# Patient Record
Sex: MALE | Race: WHITE | NOT HISPANIC OR LATINO
[De-identification: names, ages, dates, MRNs, and addresses within clinical notes are randomized per-mention and may not be internally consistent; named-entity substitution may affect disease eponyms.]

---

## 2018-10-19 ENCOUNTER — MEDICATION RENEWAL (OUTPATIENT)
Age: 58
End: 2018-10-19

## 2018-10-19 ENCOUNTER — RX RENEWAL (OUTPATIENT)
Age: 58
End: 2018-10-19

## 2018-10-19 PROBLEM — Z00.00 ENCOUNTER FOR PREVENTIVE HEALTH EXAMINATION: Status: ACTIVE | Noted: 2018-10-19

## 2018-12-07 ENCOUNTER — RECORD ABSTRACTING (OUTPATIENT)
Age: 58
End: 2018-12-07

## 2018-12-07 DIAGNOSIS — Z86.59 PERSONAL HISTORY OF OTHER MENTAL AND BEHAVIORAL DISORDERS: ICD-10-CM

## 2018-12-07 DIAGNOSIS — Z78.9 OTHER SPECIFIED HEALTH STATUS: ICD-10-CM

## 2018-12-07 DIAGNOSIS — F17.200 NICOTINE DEPENDENCE, UNSPECIFIED, UNCOMPLICATED: ICD-10-CM

## 2018-12-07 DIAGNOSIS — Z80.9 FAMILY HISTORY OF MALIGNANT NEOPLASM, UNSPECIFIED: ICD-10-CM

## 2018-12-07 DIAGNOSIS — Z86.39 PERSONAL HISTORY OF OTHER ENDOCRINE, NUTRITIONAL AND METABOLIC DISEASE: ICD-10-CM

## 2018-12-10 ENCOUNTER — APPOINTMENT (OUTPATIENT)
Dept: ENDOCRINOLOGY | Facility: CLINIC | Age: 58
End: 2018-12-10
Payer: COMMERCIAL

## 2018-12-10 ENCOUNTER — RX CHANGE (OUTPATIENT)
Age: 58
End: 2018-12-10

## 2018-12-10 VITALS
SYSTOLIC BLOOD PRESSURE: 150 MMHG | DIASTOLIC BLOOD PRESSURE: 80 MMHG | HEIGHT: 61 IN | OXYGEN SATURATION: 98 % | HEART RATE: 61 BPM | WEIGHT: 194 LBS | BODY MASS INDEX: 36.63 KG/M2

## 2018-12-10 LAB — GLUCOSE BLDC GLUCOMTR-MCNC: 119

## 2018-12-10 PROCEDURE — 82962 GLUCOSE BLOOD TEST: CPT

## 2018-12-10 PROCEDURE — 95249 CONT GLUC MNTR PT PROV EQP: CPT

## 2018-12-10 PROCEDURE — 99214 OFFICE O/P EST MOD 30 MIN: CPT | Mod: 25

## 2018-12-10 RX ORDER — ALPRAZOLAM 1 MG/1
1 TABLET ORAL
Qty: 90 | Refills: 0 | Status: ACTIVE | COMMUNITY
Start: 2018-08-27

## 2018-12-10 RX ORDER — LAMOTRIGINE 100 MG/1
100 TABLET ORAL
Qty: 30 | Refills: 0 | Status: ACTIVE | COMMUNITY
Start: 2018-05-29

## 2018-12-10 RX ORDER — BLOOD SUGAR DIAGNOSTIC
STRIP MISCELLANEOUS
Qty: 100 | Refills: 0 | Status: ACTIVE | COMMUNITY
Start: 2018-04-09

## 2018-12-10 RX ORDER — FLUOXETINE HYDROCHLORIDE 20 MG/1
20 CAPSULE ORAL
Refills: 0 | Status: DISCONTINUED | COMMUNITY
End: 2018-12-10

## 2019-01-04 ENCOUNTER — APPOINTMENT (OUTPATIENT)
Dept: ENDOCRINOLOGY | Facility: CLINIC | Age: 59
End: 2019-01-04
Payer: COMMERCIAL

## 2019-01-04 PROCEDURE — G0108 DIAB MANAGE TRN  PER INDIV: CPT

## 2019-01-07 ENCOUNTER — RESULT CHARGE (OUTPATIENT)
Age: 59
End: 2019-01-07

## 2019-02-19 ENCOUNTER — RECORD ABSTRACTING (OUTPATIENT)
Age: 59
End: 2019-02-19

## 2019-02-19 DIAGNOSIS — Z78.9 OTHER SPECIFIED HEALTH STATUS: ICD-10-CM

## 2019-02-19 DIAGNOSIS — Z87.19 PERSONAL HISTORY OF OTHER DISEASES OF THE DIGESTIVE SYSTEM: ICD-10-CM

## 2019-03-04 ENCOUNTER — APPOINTMENT (OUTPATIENT)
Dept: ENDOCRINOLOGY | Facility: CLINIC | Age: 59
End: 2019-03-04

## 2019-03-08 ENCOUNTER — MEDICATION RENEWAL (OUTPATIENT)
Age: 59
End: 2019-03-08

## 2019-03-25 ENCOUNTER — APPOINTMENT (OUTPATIENT)
Dept: ENDOCRINOLOGY | Facility: CLINIC | Age: 59
End: 2019-03-25
Payer: COMMERCIAL

## 2019-03-25 VITALS
DIASTOLIC BLOOD PRESSURE: 78 MMHG | HEIGHT: 73 IN | HEART RATE: 96 BPM | OXYGEN SATURATION: 74 % | BODY MASS INDEX: 26.51 KG/M2 | WEIGHT: 200 LBS | SYSTOLIC BLOOD PRESSURE: 120 MMHG

## 2019-03-25 PROCEDURE — 95251 CONT GLUC MNTR ANALYSIS I&R: CPT

## 2019-03-25 PROCEDURE — 99214 OFFICE O/P EST MOD 30 MIN: CPT | Mod: 25

## 2019-03-25 NOTE — HISTORY OF PRESENT ILLNESS
[FreeTextEntry1] : Pt. reports that he belongs to a partial patient program at [facility] for a hx of depression and PTSD. The program provides group therapy Monday-Friday. He also reports that he is under extreme stress with his wife's current condition who has vascular dementia and resides at [facility] in [place]. He relieves his stress by spending time with his 14 cats and watching TV.   \par \par Quality: Type 2 DM\par Severity: uncontrolled \par Duration: over 10 years ago\par Onset: dizziness \par Modifying Factors: Better with insulin\par Associated Symptoms:\par \par Current Regimen:\par Humalog before breakfast and lunch  4-6 units and 25 units before dinner\par Lantus 28 units in am \par \par Self blood sugar monitoring: nanda personal 14 day - download - average glucose 194, 60% of the time BS above 180, standard deviation 67, BS rises after dinner then continues to stay high until corrected after lunch\par Pt. reports waking up at 3 am to have coffee and he also reports having poor eating habits\par Once in awhile his BS drops low (90s) before dinner then does not take any insulin \par nanda result today 190 before breakfast \par \par exercise: none\par \par Diet:\par B- butter rolls, cereal \par L- skips, chicken, pasta\par D- skips, peanut and butter jelly \par Snacks - cookies\par \par Date of last eye exam: 2 years ago (-) diabetic retinopathy \par Date of last foot exam: 2019\par Date of last flu vaccine: 2018\par Date of last Pneumovax: no

## 2019-03-25 NOTE — PHYSICAL EXAM
[Alert] : alert [No Acute Distress] : no acute distress [Well Nourished] : well nourished [Well Developed] : well developed [Normal Sclera/Conjunctiva] : normal sclera/conjunctiva [EOMI] : extra ocular movement intact [Normal Hearing] : hearing was normal [Supple] : the neck was supple [No LAD] : no lymphadenopathy [Thyroid Not Enlarged] : the thyroid was not enlarged [No Thyroid Nodules] : there were no palpable thyroid nodules [Normal Rate and Effort] : normal respiratory rhythm and effort [No Accessory Muscle Use] : no accessory muscle use [Clear to Auscultation] : lungs were clear to auscultation bilaterally [Normal Rate] : heart rate was normal  [Normal S1, S2] : normal S1 and S2 [Regular Rhythm] : with a regular rhythm [Pedal Pulses Normal] : the pedal pulses are present [No Edema] : there was no peripheral edema [Normal Bowel Sounds] : normal bowel sounds [Not Tender] : non-tender [Soft] : abdomen soft [Normal Gait] : normal gait [Acanthosis Nigricans] : no acanthosis nigricans [Right Foot Was Examined] : right foot ~C was examined [Left Foot Was Examined] : left foot ~C was examined [Normal] : normal [Full ROM] : with full range of motion [Diminished Throughout Both Feet] : normal tactile sensation with monofilament testing throughout both feet [No Tremors] : no tremors [Oriented x3] : oriented to person, place, and time [Normal Insight/Judgement] : insight and judgment were intact [Normal Mood] : the mood was normal

## 2019-03-25 NOTE — REASON FOR VISIT
[Follow-Up: _____] : a [unfilled] follow-up visit [FreeTextEntry1] : Type 2 DM, Hypercholesterolemia, and vitamin D Deficiency

## 2019-03-25 NOTE — REVIEW OF SYSTEMS
[Fatigue] : fatigue [Decreased Appetite] : ~L decreased appetite [Recent Weight Gain (___ Lbs)] : recent [unfilled] ~Ulb weight gain [Palpitations] : palpitations [SOB on Exertion] : shortness of breath during exertion [Depression] : depression [Anxiety] : anxiety [Visual Field Defect] : no visual field defect [Blurry Vision] : no blurred vision [Dysphagia] : no dysphagia [Dysphonia] : no dysphonia [Neck Pain] : no neck pain [Chest Pain] : no chest pain [Constipation] : no constipation [Diarrhea] : no diarrhea [Polyuria] : no polyuria [Dysuria] : no dysuria [Headache] : no headaches [Tremors] : no tremors [Polydipsia] : no polydipsia [Cold Intolerance] : cold tolerant [Heat Intolerance] : heat tolerant [Easy Bruising] : no tendency for easy bruising [Swelling] : no swelling [FreeTextEntry5] : r/t increase stress, followed by cardiology  [de-identified] : controlled with medication

## 2019-03-25 NOTE — ASSESSMENT
[FreeTextEntry1] : 59 y/o male with Type 2 DM, Hypercholesterolemia, and vitamin D Deficiency. No recent lab results. \par \par Plan: \par Type 2 DM: labs now\par - Increase Lantus to 33 units in am \par - continue Humalog 4-6 units before breakfast,\par   if -200 give 4 units and  >200 at lunch time give 6 units\par - continue Humalog 25 units before dinner, if BS <100 then give half the dose \par - educated on healthy food choices\par - encouraged to increase routine exercise\par \par Hypercholesterolemia: monitor labs\par \par Vitamin D Deficiency: monitor labs\par \par Keep scheduled follow up visit with Dr. Rai in May.

## 2019-04-08 ENCOUNTER — OTHER (OUTPATIENT)
Age: 59
End: 2019-04-08

## 2019-05-17 LAB
HBA1C MFR BLD HPLC: 7.1
LDLC SERPL DIRECT ASSAY-MCNC: 184

## 2019-05-20 ENCOUNTER — APPOINTMENT (OUTPATIENT)
Dept: ENDOCRINOLOGY | Facility: CLINIC | Age: 59
End: 2019-05-20
Payer: COMMERCIAL

## 2019-05-20 VITALS
WEIGHT: 205 LBS | DIASTOLIC BLOOD PRESSURE: 70 MMHG | SYSTOLIC BLOOD PRESSURE: 136 MMHG | HEART RATE: 67 BPM | HEIGHT: 73 IN | BODY MASS INDEX: 27.17 KG/M2

## 2019-05-20 LAB — GLUCOSE BLDC GLUCOMTR-MCNC: 196

## 2019-05-20 PROCEDURE — 99214 OFFICE O/P EST MOD 30 MIN: CPT | Mod: 25

## 2019-05-20 PROCEDURE — 82962 GLUCOSE BLOOD TEST: CPT

## 2019-05-20 RX ORDER — CLOTRIMAZOLE 10 MG/G
1 CREAM TOPICAL
Qty: 30 | Refills: 0 | Status: DISCONTINUED | COMMUNITY
Start: 2018-11-27 | End: 2019-05-20

## 2019-05-20 RX ORDER — INSULIN GLULISINE 100 [IU]/ML
100 INJECTION, SOLUTION SUBCUTANEOUS
Refills: 0 | Status: DISCONTINUED | COMMUNITY
End: 2019-05-20

## 2019-05-20 RX ORDER — MUPIROCIN 20 MG/G
2 OINTMENT TOPICAL
Qty: 22 | Refills: 0 | Status: DISCONTINUED | COMMUNITY
Start: 2018-08-22 | End: 2019-05-20

## 2019-05-20 RX ORDER — LANCETS 33 GAUGE
EACH MISCELLANEOUS
Qty: 100 | Refills: 0 | Status: DISCONTINUED | COMMUNITY
Start: 2018-04-09 | End: 2019-05-20

## 2019-05-20 NOTE — ASSESSMENT
[FreeTextEntry1] : DM, insulin treated, loss of control due to dietary indiscretion. Rising glucose after supper; at times will lower prandial dose excessively if glucose level low \par Hyperlipidemia; on atorvastatin in the past. Will resume

## 2019-05-20 NOTE — HISTORY OF PRESENT ILLNESS
[FreeTextEntry1] : Pt. reports that he belongs to a partial patient program at [facility] for a hx of depression and PTSD. The program provides group therapy Monday-Friday. He also reports that he is under extreme stress with his wife's current condition who has vascular dementia and resides at [facility] in [place]. He relieves his stress by spending time with his 14 cats and watching TV.   \par \par Quality: Type 2 DM\par Severity: uncontrolled \par Duration: over 10 years ago\par Onset: dizziness \par Modifying Factors: Better with insulin\par Associated Symptoms:\par \par Current Regimen:\par Humalog before breakfast and lunch  6-10 units and 20 units before dinner\par Lantus 33 units in am \par \par \par exercise: none\par \par Diet:\par B- butter rolls, cereal \par L- skips, chicken, pasta\par D- skips, peanut and butter jelly \par Snacks - cookies\par \par PMH:\par pancreatitis\par elevated cholesterol

## 2019-05-20 NOTE — PHYSICAL EXAM
[Clear to Auscultation] : lungs were clear to auscultation bilaterally [Thyroid Not Enlarged] : the thyroid was not enlarged [Regular Rhythm] : with a regular rhythm

## 2019-09-24 ENCOUNTER — APPOINTMENT (OUTPATIENT)
Dept: ENDOCRINOLOGY | Facility: CLINIC | Age: 59
End: 2019-09-24
Payer: COMMERCIAL

## 2019-09-24 PROCEDURE — 36415 COLL VENOUS BLD VENIPUNCTURE: CPT

## 2019-09-25 LAB
ALBUMIN SERPL ELPH-MCNC: 4.5 G/DL
ALP BLD-CCNC: 83 U/L
ALT SERPL-CCNC: 12 U/L
ANION GAP SERPL CALC-SCNC: 12 MMOL/L
AST SERPL-CCNC: 12 U/L
BASOPHILS # BLD AUTO: 0.04 K/UL
BASOPHILS NFR BLD AUTO: 0.5 %
BILIRUB SERPL-MCNC: 0.3 MG/DL
BUN SERPL-MCNC: 12 MG/DL
C PEPTIDE SERPL-MCNC: 0.7 NG/ML
CALCIUM SERPL-MCNC: 9.5 MG/DL
CHLORIDE SERPL-SCNC: 100 MMOL/L
CHOLEST SERPL-MCNC: 205 MG/DL
CHOLEST/HDLC SERPL: 5.1 RATIO
CO2 SERPL-SCNC: 26 MMOL/L
CREAT SERPL-MCNC: 0.66 MG/DL
CREAT SPEC-SCNC: 167 MG/DL
EOSINOPHIL # BLD AUTO: 0.07 K/UL
EOSINOPHIL NFR BLD AUTO: 1 %
ESTIMATED AVERAGE GLUCOSE: 197 MG/DL
GLUCOSE SERPL-MCNC: 98 MG/DL
HBA1C MFR BLD HPLC: 8.5 %
HCT VFR BLD CALC: 41 %
HDLC SERPL-MCNC: 40 MG/DL
HGB BLD-MCNC: 13.3 G/DL
IMM GRANULOCYTES NFR BLD AUTO: 0.3 %
LDLC SERPL CALC-MCNC: 145 MG/DL
LYMPHOCYTES # BLD AUTO: 2.68 K/UL
LYMPHOCYTES NFR BLD AUTO: 36.8 %
MAN DIFF?: NORMAL
MCHC RBC-ENTMCNC: 29.2 PG
MCHC RBC-ENTMCNC: 32.4 GM/DL
MCV RBC AUTO: 89.9 FL
MICROALBUMIN 24H UR DL<=1MG/L-MCNC: 1.8 MG/DL
MICROALBUMIN/CREAT 24H UR-RTO: 11 MG/G
MONOCYTES # BLD AUTO: 0.62 K/UL
MONOCYTES NFR BLD AUTO: 8.5 %
NEUTROPHILS # BLD AUTO: 3.86 K/UL
NEUTROPHILS NFR BLD AUTO: 52.9 %
PLATELET # BLD AUTO: 234 K/UL
POTASSIUM SERPL-SCNC: 4.5 MMOL/L
PROT SERPL-MCNC: 6.6 G/DL
RBC # BLD: 4.56 M/UL
RBC # FLD: 13.1 %
SODIUM SERPL-SCNC: 138 MMOL/L
TRIGL SERPL-MCNC: 100 MG/DL
TSH SERPL-ACNC: 0.47 UIU/ML
WBC # FLD AUTO: 7.29 K/UL

## 2019-09-30 ENCOUNTER — APPOINTMENT (OUTPATIENT)
Dept: ENDOCRINOLOGY | Facility: CLINIC | Age: 59
End: 2019-09-30
Payer: COMMERCIAL

## 2019-09-30 VITALS
BODY MASS INDEX: 23.86 KG/M2 | HEIGHT: 73 IN | SYSTOLIC BLOOD PRESSURE: 114 MMHG | WEIGHT: 180 LBS | HEART RATE: 65 BPM | DIASTOLIC BLOOD PRESSURE: 72 MMHG

## 2019-09-30 LAB — GLUCOSE BLDC GLUCOMTR-MCNC: 118

## 2019-09-30 PROCEDURE — 82962 GLUCOSE BLOOD TEST: CPT

## 2019-09-30 PROCEDURE — 99214 OFFICE O/P EST MOD 30 MIN: CPT | Mod: 25

## 2019-09-30 NOTE — HISTORY OF PRESENT ILLNESS
[FreeTextEntry1] : Pt. reports that he belongs to a partial patient program at [facility] for a hx of depression and PTSD. The program provides group therapy Monday-Friday. He also reports that he is under extreme stress with his wife's current condition who has vascular dementia and resides at [facility] in [place]. He relieves his stress by spending time with his 14 cats and watching TV.   \par \par Quality: Type 2 DM\par Severity: uncontrolled \par Duration: over 10 years ago\par Onset: dizziness \par Modifying Factors: Better with insulin\par Associated Symptoms:\par \par Current Regimen:\par Humalog AC. Basically very erratic diet, prandial insulin used intermittently; may only have one meal a day\par Lantus 33 units in am \par \par \par exercise: none\par \par PMH:\par pancreatitis\par elevated cholesterol\par cutting down on smoking

## 2020-02-24 ENCOUNTER — APPOINTMENT (OUTPATIENT)
Dept: ENDOCRINOLOGY | Facility: CLINIC | Age: 60
End: 2020-02-24

## 2020-03-20 ENCOUNTER — RX RENEWAL (OUTPATIENT)
Age: 60
End: 2020-03-20

## 2020-04-21 ENCOUNTER — RX RENEWAL (OUTPATIENT)
Age: 60
End: 2020-04-21

## 2020-08-14 ENCOUNTER — APPOINTMENT (OUTPATIENT)
Dept: ENDOCRINOLOGY | Facility: CLINIC | Age: 60
End: 2020-08-14
Payer: COMMERCIAL

## 2020-08-14 VITALS
HEIGHT: 78 IN | WEIGHT: 203 LBS | BODY MASS INDEX: 23.49 KG/M2 | SYSTOLIC BLOOD PRESSURE: 120 MMHG | HEART RATE: 69 BPM | DIASTOLIC BLOOD PRESSURE: 80 MMHG

## 2020-08-14 LAB — GLUCOSE BLDC GLUCOMTR-MCNC: 229

## 2020-08-14 PROCEDURE — 95251 CONT GLUC MNTR ANALYSIS I&R: CPT

## 2020-08-14 PROCEDURE — 82962 GLUCOSE BLOOD TEST: CPT

## 2020-08-14 PROCEDURE — 99214 OFFICE O/P EST MOD 30 MIN: CPT | Mod: 25

## 2020-08-14 NOTE — PHYSICAL EXAM
[No Thyroid Nodules] : no palpable thyroid nodules [Normal to Percussion] : lungs were normal to percussion [Normal S1, S2] : normal S1 and S2

## 2020-08-14 NOTE — HISTORY OF PRESENT ILLNESS
[Continuous Glucose Monitoring] : Continuous Glucose Monitoring: Yes [Errol] : Errol [FreeTextEntry2] : 58 [FreeTextEntry1] : Pt. reports that he belongs to a partial patient program at [facility] for a hx of depression and PTSD. The program provides group therapy Monday-Friday. He also reports that he is under extreme stress with his wife's current condition who has vascular dementia and resides at [facility] in [place]. He relieves his stress by spending time with his 14 cats and watching TV.   \par \par Quality: Type 2 DM\par Severity: uncontrolled \par Duration: over 10 years ago\par Onset: dizziness \par Modifying Factors: Better with insulin\par Associated Symptoms:\par \par Current Regimen:\par Humalog AC. Basically very erratic diet, prandial insulin used intermittently; may only have one meal a day 10-15 units\par Lantus 30 units in am \par \par \par exercise: none\par \par PMH:\par pancreatitis\par elevated cholesterol\par cutting down on smoking\par ASHD, stents not needed. ? rae [FreeTextEntry4] : 3 [FreeTextEntry3] : 39 [FreeTextEntry5] : 163 [de-identified] : 7.2 [FreeTextEntry6] : 32.3

## 2020-08-14 NOTE — ASSESSMENT
[FreeTextEntry1] : DM, insulin treated, likely pancreatic. Improving control recently, with variability. Lower lunch injection to avoid lows\par Hyperlipidemia; needs to improve adherence with atorvastatin\par underlying depression an aggravating factor

## 2020-12-23 ENCOUNTER — APPOINTMENT (OUTPATIENT)
Dept: ENDOCRINOLOGY | Facility: CLINIC | Age: 60
End: 2020-12-23
Payer: COMMERCIAL

## 2020-12-23 PROCEDURE — 99443: CPT

## 2020-12-23 NOTE — HISTORY OF PRESENT ILLNESS
[Home] : at home, [unfilled] , at the time of the visit. [Other Location: e.g. Home (Enter Location, City,State)___] : at [unfilled] [Verbal consent obtained from patient] : the patient, [unfilled] [FreeTextEntry1] : Pt. reports that he belongs to a partial patient program at [facility] for a hx of depression and PTSD. The program provides group therapy Monday-Friday. He also reports that he is under extreme stress with his wife's current condition who has vascular dementia and resides at [facility] in [place]. He relieves his stress by spending time with his 14 cats and watching TV.   \par \par Quality: Type 2 DM\par Severity: uncontrolled \par Duration: over 10 years ago\par Onset: dizziness \par Modifying Factors: Better with insulin\par Associated Symptoms:\par \par Current Regimen:\par Humalog AC. Basically very erratic diet, prandial insulin used intermittently; may only have one meal a day 10-15 units\par Lantus 30 units in am \par \par \par exercise: none\par \par PMH:\par pancreatitis\par elevated cholesterol\par cutting down on smoking\par ASHD, stents not needed. ? rae

## 2020-12-23 NOTE — ASSESSMENT
[FreeTextEntry1] : DM, insulin treated, likely pancreatic. Uncontrolled. Will evaluate logs and nanda download to improve insulin use. Discussed adjustments for humalog prior to activity (erasmo).

## 2021-04-19 ENCOUNTER — APPOINTMENT (OUTPATIENT)
Dept: ENDOCRINOLOGY | Facility: CLINIC | Age: 61
End: 2021-04-19
Payer: COMMERCIAL

## 2021-04-19 VITALS
HEART RATE: 65 BPM | DIASTOLIC BLOOD PRESSURE: 70 MMHG | SYSTOLIC BLOOD PRESSURE: 120 MMHG | BODY MASS INDEX: 22.91 KG/M2 | WEIGHT: 198 LBS | HEIGHT: 78 IN

## 2021-04-19 LAB — GLUCOSE BLDC GLUCOMTR-MCNC: 271

## 2021-04-19 PROCEDURE — 95251 CONT GLUC MNTR ANALYSIS I&R: CPT

## 2021-04-19 PROCEDURE — 99214 OFFICE O/P EST MOD 30 MIN: CPT | Mod: 25

## 2021-04-19 PROCEDURE — 99072 ADDL SUPL MATRL&STAF TM PHE: CPT

## 2021-04-19 PROCEDURE — 82962 GLUCOSE BLOOD TEST: CPT

## 2021-04-19 RX ORDER — FLUOXETINE HYDROCHLORIDE 40 MG/1
CAPSULE ORAL
Refills: 0 | Status: ACTIVE | COMMUNITY

## 2021-04-19 NOTE — HISTORY OF PRESENT ILLNESS
[FreeTextEntry1] : Pt. reports that he belongs to a partial patient program at [facility] for a hx of depression and PTSD. The program provides group therapy Monday-Friday. He also reports that he is under extreme stress with his wife's current condition who has vascular dementia and resides at [facility] in [place]. He relieves his stress by spending time with his 14 cats and watching TV.   \par \par Quality: Type 2 DM\par Severity: uncontrolled \par Duration: over 10 years ago\par Onset: dizziness \par Modifying Factors: Better with insulin\par Associated Symptoms:\par \par Current Regimen:\par Humalog AC. Basically very erratic diet, prandial insulin used intermittently; may only have one meal a day 10-15 units\par Lantus 30 units in am \par \par \par exercise: none\par \par PMH:\par pancreatitis\par elevated cholesterol\par cutting down on smoking\par ASHD, stents not needed. ? takasobo\par \par s/p infection of left thumb\par \par erratic schedule. had one hypoglycemic episode after bowling, so reluctant to take insulin for meals before bowling, which he does at various times 5 days a week. Often omits humalog. Drinks coffee at 2 am. [Continuous Glucose Monitoring] : Continuous Glucose Monitoring: Yes [Errol] : Errol [FreeTextEntry2] : 14 [FreeTextEntry3] : 86 [FreeTextEntry4] : 0 [de-identified] : 10.1 [FreeTextEntry5] : 791 [FreeTextEntry6] : 29.8

## 2021-04-19 NOTE — ASSESSMENT
[FreeTextEntry1] : DM, insulin treated, likely pancreatic. Uncontrolled. \par Basal insulin likely correct. Using humalog sliding scale:\par 100-149 4 units\par 150-199 6\par 200-249 8\par 250-299 10\par 300-349 12\par 350+       14\par lower by 50% if bowling

## 2021-08-26 LAB
HBA1C MFR BLD HPLC: 9.7
LDLC SERPL DIRECT ASSAY-MCNC: 110
MICROALBUMIN/CREAT 24H UR-RTO: 21

## 2021-08-27 ENCOUNTER — APPOINTMENT (OUTPATIENT)
Dept: ENDOCRINOLOGY | Facility: CLINIC | Age: 61
End: 2021-08-27
Payer: COMMERCIAL

## 2021-08-27 VITALS
OXYGEN SATURATION: 96 % | DIASTOLIC BLOOD PRESSURE: 68 MMHG | SYSTOLIC BLOOD PRESSURE: 122 MMHG | BODY MASS INDEX: 23.6 KG/M2 | HEART RATE: 70 BPM | HEIGHT: 78 IN | WEIGHT: 204 LBS

## 2021-08-27 LAB — GLUCOSE BLDC GLUCOMTR-MCNC: 216

## 2021-08-27 PROCEDURE — 82962 GLUCOSE BLOOD TEST: CPT

## 2021-08-27 PROCEDURE — 95251 CONT GLUC MNTR ANALYSIS I&R: CPT

## 2021-08-27 PROCEDURE — 99214 OFFICE O/P EST MOD 30 MIN: CPT | Mod: 25

## 2021-08-27 NOTE — HISTORY OF PRESENT ILLNESS
[FreeTextEntry1] : Pt. reports that he belongs to a partial patient program at [facility] for a hx of depression and PTSD. The program provides group therapy Monday-Friday. He also reports that he is under extreme stress with his wife's current condition who has vascular dementia and resides at [facility] in [place]. He relieves his stress by spending time with his 14 cats and watching TV.   \par REcently stress level has diminished; has a part time job, and he is a bit more organized\par \par Quality: Type 2 DM\par Severity: uncontrolled \par Duration: over 10 years ago\par Onset: dizziness \par Modifying Factors: Better with insulin\par Associated Symptoms:\par \par Current Regimen:\par Humalog AC. Basically very erratic diet, prandial insulin used intermittently; may only have one meal a day 10-15 units\par Lantus 30 units in am \par \par \par exercise: none\par \par PMH:\par pancreatitis\par elevated cholesterol\par cutting down on smoking\par ASHD, stents not needed. ? takasobo\par \par s/p infection of left thumb\par \par erratic schedule. had one hypoglycemic episode after bowling, so reluctant to take insulin for meals before bowling, which he does at various times 5 days a week. Often omits humalog. Drinks coffee at 2 am. [Continuous Glucose Monitoring] : Continuous Glucose Monitoring: Yes [Errol] : Errol [FreeTextEntry2] : 77 [FreeTextEntry3] : 23 [FreeTextEntry4] : 0 [de-identified] : 9.0 [FreeTextEntry5] : 237 [FreeTextEntry6] : 29.7

## 2021-08-27 NOTE — ASSESSMENT
[FreeTextEntry1] : DM, insulin treated, likely pancreatic. Uncontrolled, but improving. Postprandial hyperglycemia\par Basal insulin likely correct. Using humalog sliding scale:\par 100-149 5 units\par 150-199 7\par 200-249 8\par 250-299 10\par 300-349 12\par 350+       14\par lower by 50% if bowling\par \par hyperlipidemia improving

## 2021-08-29 NOTE — HISTORY OF PRESENT ILLNESS
[Continuous Glucose Monitoring] : Continuous Glucose Monitoring: Yes [Errol] : Errol [FreeTextEntry1] : Pt. reports that he belongs to a partial patient program at [facility] for a hx of depression and PTSD. The program provides group therapy Monday-Friday. He also reports that he is under extreme stress with his wife's current condition who has vascular dementia and resides at [facility] in [place]. He relieves his stress by spending time with his 14 cats and watching TV.   \par \par Quality: Type 2 DM\par Severity: uncontrolled \par Duration: over 10 years ago\par Onset: dizziness \par Modifying Factors: Better with insulin\par Associated Symptoms:\par \par Current Regimen:\par Humalog AC. Basically very erratic diet, prandial insulin used intermittently; may only have one meal a day 10-15 units\par Lantus 30 units in am \par \par \par exercise: none\par \par PMH:\par pancreatitis\par elevated cholesterol\par cutting down on smoking\par ASHD, stents not needed. ? takasobo\par \par s/p infection of left thumb\par \par erratic schedule. had one hypoglycemic episode after bowling, so reluctant to take insulin for meals before bowling, which he does at various times 5 days a week. Often omits humalog. Drinks coffee at 2 am. [FreeTextEntry2] : 23 [FreeTextEntry3] : 77 [FreeTextEntry4] : 0 [de-identified] : 9.0 [FreeTextEntry5] : 237 [FreeTextEntry6] : 29.7

## 2021-12-16 ENCOUNTER — APPOINTMENT (OUTPATIENT)
Dept: ENDOCRINOLOGY | Facility: CLINIC | Age: 61
End: 2021-12-16
Payer: COMMERCIAL

## 2021-12-16 ENCOUNTER — RESULT CHARGE (OUTPATIENT)
Age: 61
End: 2021-12-16

## 2021-12-16 VITALS
WEIGHT: 194 LBS | HEART RATE: 57 BPM | OXYGEN SATURATION: 97 % | SYSTOLIC BLOOD PRESSURE: 120 MMHG | DIASTOLIC BLOOD PRESSURE: 80 MMHG | HEIGHT: 78 IN | BODY MASS INDEX: 22.45 KG/M2

## 2021-12-16 LAB — GLUCOSE BLDC GLUCOMTR-MCNC: 157

## 2021-12-16 PROCEDURE — 82962 GLUCOSE BLOOD TEST: CPT

## 2021-12-16 PROCEDURE — 99214 OFFICE O/P EST MOD 30 MIN: CPT | Mod: 25

## 2021-12-16 PROCEDURE — 95251 CONT GLUC MNTR ANALYSIS I&R: CPT

## 2021-12-16 NOTE — HISTORY OF PRESENT ILLNESS
[Continuous Glucose Monitoring] : Continuous Glucose Monitoring: Yes [Errol] : Errol [FreeTextEntry1] : Pt. reports that he belongs to a partial patient program at [facility] for a hx of depression and PTSD. The program provides group therapy Monday-Friday. He also reports that he is under extreme stress with his wife's current condition who has vascular dementia and resides at [facility] in [place]. He relieves his stress by spending time with his 14 cats and watching TV.   \par \par Quality: Type 2 DM\par Severity: uncontrolled \par Duration: over 10 years ago\par Onset: dizziness \par Modifying Factors: Better with insulin\par Associated Symptoms:\par \par Current Regimen:\par Humalog AC. Basically very erratic diet, prandial insulin used intermittently; may only have one meal a day 10-15 units\par Lantus 30 units in am \par \par \par exercise: none\par \par PMH:\par pancreatitis\par elevated cholesterol\par cutting down on smoking\par ASHD, stents not needed. ? takasobo\par \par s/p infection of left thumb\par \par erratic schedule. had one hypoglycemic episode after bowling, so reluctant to take insulin for meals before bowling, which he does at various times 5 days a week. Often omits humalog. Drinks coffee at 2 am. [FreeTextEntry2] : 5 [FreeTextEntry3] : 95 [FreeTextEntry4] : 0 [de-identified] : 9.5 [FreeTextEntry5] : 862 [FreeTextEntry6] : 22.2

## 2021-12-16 NOTE — ASSESSMENT
[FreeTextEntry1] : DM, insulin treated, likely pancreatic. Uncontrolled, but improving. Postprandial hyperglycemia\par Basal insulin likely correct. Using humalog sliding scale:\par 100-149 5 units\par 150-199 7\par 200-249 8\par 250-299 10\par 300-349 12\par 350+       14\par lower by 50% if bowling\par \par hyperlipidemia on therapy

## 2022-01-11 RX ORDER — INSULIN GLARGINE 100 [IU]/ML
100 INJECTION, SOLUTION SUBCUTANEOUS DAILY
Qty: 3 | Refills: 3 | Status: DISCONTINUED | COMMUNITY
Start: 1900-01-01 | End: 2022-01-11

## 2022-04-10 ENCOUNTER — NON-APPOINTMENT (OUTPATIENT)
Age: 62
End: 2022-04-10

## 2022-04-13 LAB
HBA1C MFR BLD HPLC: 10.6
LDLC SERPL CALC-MCNC: 95
MICROALBUMIN/CREAT 24H UR-RTO: 57

## 2022-04-14 ENCOUNTER — APPOINTMENT (OUTPATIENT)
Dept: ENDOCRINOLOGY | Facility: CLINIC | Age: 62
End: 2022-04-14
Payer: COMMERCIAL

## 2022-04-14 VITALS
BODY MASS INDEX: 26.11 KG/M2 | HEIGHT: 73 IN | OXYGEN SATURATION: 97 % | WEIGHT: 197 LBS | HEART RATE: 67 BPM | DIASTOLIC BLOOD PRESSURE: 60 MMHG | SYSTOLIC BLOOD PRESSURE: 106 MMHG

## 2022-04-14 LAB — GLUCOSE BLDC GLUCOMTR-MCNC: 144

## 2022-04-14 PROCEDURE — 95251 CONT GLUC MNTR ANALYSIS I&R: CPT

## 2022-04-14 PROCEDURE — 99214 OFFICE O/P EST MOD 30 MIN: CPT | Mod: 25

## 2022-04-14 PROCEDURE — 82962 GLUCOSE BLOOD TEST: CPT

## 2022-04-14 NOTE — HISTORY OF PRESENT ILLNESS
[Continuous Glucose Monitoring] : Continuous Glucose Monitoring: Yes [Errol] : Errol [FreeTextEntry1] : Pt. reports that he belongs to a partial patient program at [facility] for a hx of depression and PTSD. The program provides group therapy Monday-Friday. He also reports that he is under extreme stress with his wife's current condition who has vascular dementia and resides at [facility] in [place]. He relieves his stress by spending time with his 14 cats and watching TV.   \par \par Quality: Type 2 DM\par Severity: uncontrolled \par Duration: over 10 years ago\par Onset: dizziness \par Modifying Factors: Better with insulin\par Associated Symptoms:\par \par Current Regimen:\par Humalog AC. Basically very erratic diet, prandial insulin used intermittently; may only have once or twice a day 10-15 units\par basaglar 30 units in am \par \par \par exercise: none\par \par PMH:\par pancreatitis\par elevated cholesterol\par cutting down on smoking\par ASHD, stents not needed. ? takasobo\par \par s/p infection of left thumb\par \par erratic schedule. had one hypoglycemic episode after bowling, so reluctant to take insulin for meals before bowling, which he does at various times 5 days a week. Often omits humalog. Drinks coffee at 2 am. [FreeTextEntry3] : 79 [FreeTextEntry2] : 19 [FreeTextEntry4] : 2 [de-identified] : 9.3 [FreeTextEntry5] : 251 [FreeTextEntry6] : 34.3

## 2022-04-14 NOTE — ASSESSMENT
[FreeTextEntry1] : DM, insulin treated, likely pancreatic. Uncontrolled, but improving. Postprandial hyperglycemia\par Basal insulin likely correct.Prior insulin scale below not being used, and pt. remains inconsistent with prandial injections:\par (100-149 5 units\par 150-199 7\par 200-249 8\par 250-299 10\par 300-349 12\par 350+       14\par lower by 50% if bowling)\par \par hyperlipidemia on therapy\par \par encourage meal bolus

## 2022-04-19 ENCOUNTER — TRANSCRIPTION ENCOUNTER (OUTPATIENT)
Age: 62
End: 2022-04-19

## 2022-08-15 ENCOUNTER — APPOINTMENT (OUTPATIENT)
Dept: ENDOCRINOLOGY | Facility: CLINIC | Age: 62
End: 2022-08-15

## 2022-08-15 VITALS
BODY MASS INDEX: 25.45 KG/M2 | WEIGHT: 192 LBS | HEART RATE: 64 BPM | DIASTOLIC BLOOD PRESSURE: 72 MMHG | HEIGHT: 73 IN | SYSTOLIC BLOOD PRESSURE: 130 MMHG

## 2022-08-15 LAB
GLUCOSE BLDC GLUCOMTR-MCNC: 115
HBA1C MFR BLD HPLC: 9.5
LDLC SERPL DIRECT ASSAY-MCNC: 137
MICROALBUMIN/CREAT 24H UR-RTO: 23
TSH SERPL-ACNC: 0.43

## 2022-08-15 PROCEDURE — 82962 GLUCOSE BLOOD TEST: CPT

## 2022-08-15 PROCEDURE — 99214 OFFICE O/P EST MOD 30 MIN: CPT | Mod: 25

## 2022-08-15 RX ORDER — ARIPIPRAZOLE 5 MG/1
5 TABLET ORAL DAILY
Refills: 0 | Status: ACTIVE | COMMUNITY

## 2022-08-15 NOTE — HISTORY OF PRESENT ILLNESS
[Continuous Glucose Monitoring] : Continuous Glucose Monitoring: Yes [Errol] : Errol [FreeTextEntry1] : Pt. reports that he belongs to a partial patient program at [facility] for a hx of depression and PTSD. The program provides group therapy Monday-Friday. He also reports that he is under extreme stress with his wife's current condition who has vascular dementia and resides at [facility] in [place]. He relieves his stress by spending time with his 14 cats and watching TV.   \par \par Quality: Type 2 DM\par Severity: uncontrolled \par Duration: over 10 years ago\par Onset: dizziness \par Modifying Factors: Better with insulin\par Associated Symptoms:\par \par Current Regimen:\par Humalog AC. Basically very erratic diet, prandial insulin used intermittently; may only have once or twice a day 10-15 units\par basaglar 30 units in am \par \par \par exercise: none\par \par PMH:\par pancreatitis\par elevated cholesterol\par cutting down on smoking\par ASHD, stents not needed. ? takasobo\par \par s/p infection of left thumb\par \par erratic schedule. had one hypoglycemic episode after bowling, so reluctant to take insulin for meals before bowling, which he does at various times 5 days a week. Often omits humalog. Drinks coffee at 2 am. [FreeTextEntry2] : 23 [FreeTextEntry3] : 77 [FreeTextEntry4] : 0 [de-identified] : 9.3 [FreeTextEntry5] : 250 [FreeTextEntry6] : 35.2

## 2022-08-15 NOTE — ASSESSMENT
[FreeTextEntry1] : DM, insulin treated, likely pancreatic. Uncontrolled, but improving. Postprandial hyperglycemia\par Basal insulin likely correct.Prior insulin scale below not being used, and pt. remains inconsistent with prandial injections:\par (100-149 5 units\par 150-199 7\par 200-249 8\par 250-299 10\par 300-349 12\par 350+       14\par lower by 50% if bowling)\par \par hyperlipidemia on therapy - improve adherence with pravastatin\par \par encourage meal bolus\par \par Glucose Sensor Necessity:  This patient with diabetes (dx: E10.65)  is currently using a Errol continuous glucose monitor.)  The patient is treated with insulin via four injections daily. This patient requires frequent adjustments to his insulin treatment on the basis of therapeutic continuous glucose monitoring results by adjusting fixed doses or sliding scale.  In addition, the patient has been to our office for an evaluation of his diabetes control within the past 6 months.  \par \par \par

## 2022-11-12 ENCOUNTER — NON-APPOINTMENT (OUTPATIENT)
Age: 62
End: 2022-11-12

## 2022-11-17 ENCOUNTER — APPOINTMENT (OUTPATIENT)
Dept: ENDOCRINOLOGY | Facility: CLINIC | Age: 62
End: 2022-11-17

## 2022-11-17 VITALS
DIASTOLIC BLOOD PRESSURE: 52 MMHG | HEART RATE: 72 BPM | HEIGHT: 73 IN | SYSTOLIC BLOOD PRESSURE: 112 MMHG | OXYGEN SATURATION: 97 % | WEIGHT: 208 LBS | BODY MASS INDEX: 27.57 KG/M2

## 2022-11-17 LAB — GLUCOSE BLDC GLUCOMTR-MCNC: 183

## 2022-11-17 PROCEDURE — 95251 CONT GLUC MNTR ANALYSIS I&R: CPT

## 2022-11-17 PROCEDURE — 82962 GLUCOSE BLOOD TEST: CPT

## 2022-11-17 PROCEDURE — 99214 OFFICE O/P EST MOD 30 MIN: CPT | Mod: 25

## 2022-11-17 RX ORDER — PANTOPRAZOLE 40 MG/1
40 TABLET, DELAYED RELEASE ORAL
Qty: 30 | Refills: 0 | Status: ACTIVE | COMMUNITY
Start: 2022-09-06

## 2022-11-17 RX ORDER — OFLOXACIN 3 MG/ML
0.3 SOLUTION/ DROPS OPHTHALMIC
Qty: 5 | Refills: 0 | Status: DISCONTINUED | COMMUNITY
Start: 2022-10-18

## 2022-11-17 RX ORDER — OLOPATADINE HCL 1 MG/ML
0.1 SOLUTION/ DROPS OPHTHALMIC
Qty: 5 | Refills: 0 | Status: DISCONTINUED | COMMUNITY
Start: 2022-10-18

## 2022-11-17 RX ORDER — ALPRAZOLAM 0.5 MG/1
0.5 TABLET ORAL
Qty: 30 | Refills: 0 | Status: DISCONTINUED | COMMUNITY
Start: 2022-08-08

## 2022-11-17 NOTE — ASSESSMENT
[FreeTextEntry1] : DM, insulin treated, likely pancreatic. Uncontrolled.\par -For now, increase tresiba to 35 units daily\par -Increase meal insulin bolus to 20 units when at work (is active and is nervous to go low), 25 units when at home pre meal (only eats 2 meals a day)\par -If giving a correction injection when BS is 400 and no food is involved- must only give 10 units as when he gives 20 units he has hypoglycemic episode\par -Must continue to watch diet and increase exercise as tolerated, goal of 30 mins a day 5x a week\par -Continue to follow up with all specialists including ophtho and podiatry \par \par hyperlipidemia - need updated lipid panel, continue statin\par \par \par Glucose Sensor Necessity: This patient with diabetes (dx: E10.65) is currently using a Errol continuous glucose monitor.) The patient is treated with insulin via four injections daily. This patient requires frequent adjustments to his insulin treatment on the basis of therapeutic continuous glucose monitoring results by adjusting fixed doses or sliding scale. In addition, the patient has been to our office for an evaluation of his diabetes control within the past 6 months. \par \par Need updated vit d levels\par Fasting labs due now\par \par RTO 3 months with Dr. Rai

## 2022-11-17 NOTE — PHYSICAL EXAM
[Alert] : alert [Well Nourished] : well nourished [No Acute Distress] : no acute distress [Normal Sclera/Conjunctiva] : normal sclera/conjunctiva [Normal Hearing] : hearing was normal [Thyroid Not Enlarged] : the thyroid was not enlarged [No Accessory Muscle Use] : no accessory muscle use [Clear to Auscultation] : lungs were clear to auscultation bilaterally [Normal S1, S2] : normal S1 and S2 [Normal Rate] : heart rate was normal [Not Tender] : non-tender [Soft] : abdomen soft [Normal Gait] : normal gait [No Rash] : no rash [No Tremors] : no tremors [Oriented x3] : oriented to person, place, and time

## 2022-11-17 NOTE — HISTORY OF PRESENT ILLNESS
[FreeTextEntry1] : Pt. reports that he is high stress. \par \par Quality: Type 2 DM\par Severity: uncontrolled \par Duration: over 10 years ago\par Onset: dizziness \par Modifying Factors: Better with insulin\par Associated Symptoms:\par \par Current Regimen:\par Humalog 15-20 units before meals (eats two meals a day) \par Tresiba 30 units in am - takes 100% of the time  \par \par Errol download\par Average glucose 260\par GMI 9.5%\par Glucose variability 30.7%\par \par Very high 60%\par High 24%\par Target range 16%\par Low 0%\par Very low 0%\par \par Alot of variability. Says his last meal is no later then 4 pm everyday and then doesn’t eat again until the next morning but often around 6 pm his blood sugar is 400 , he gives 20 units of Humalog, and then has hypoglycemic event. He is giving a large correction injection with no food. \par FS in office 183- had 2 donuts and a cheese Italian 3 hours ago\par exercise: none\par \par Diet:\par B- butter rolls, cereal \par L- skips, chicken, pasta\par D- skips, peanut and butter jelly \par Snacks - cookies\par \par Date of last eye exam: Summer 2022 (-) diabetic retinopathy \par Date of last foot exam: 2022\par Date of last Pneumovax: no \par  \par HLD\par On statin\par 8/2022- , HDL 58, Triglycerides 113, Total cholesterol 217\par \par Need updated vit d levels\par

## 2022-11-17 NOTE — REVIEW OF SYSTEMS
[Fatigue] : fatigue [Recent Weight Gain (___ Lbs)] : recent weight gain: [unfilled] lbs [Recent Weight Loss (___ Lbs)] : no recent weight loss [Visual Field Defect] : no visual field defect [Blurred Vision] : no blurred vision [Dysphagia] : no dysphagia [Neck Pain] : no neck pain [Dysphonia] : no dysphonia [Chest Pain] : no chest pain [Shortness Of Breath] : no shortness of breath [Constipation] : no constipation [Diarrhea] : no diarrhea [Polyuria] : no polyuria [Polydipsia] : no polydipsia [FreeTextEntry2] : 10 lbs since Aug 2022

## 2022-11-19 ENCOUNTER — NON-APPOINTMENT (OUTPATIENT)
Age: 62
End: 2022-11-19

## 2022-11-29 ENCOUNTER — NON-APPOINTMENT (OUTPATIENT)
Age: 62
End: 2022-11-29

## 2023-02-16 ENCOUNTER — APPOINTMENT (OUTPATIENT)
Dept: ENDOCRINOLOGY | Facility: CLINIC | Age: 63
End: 2023-02-16
Payer: COMMERCIAL

## 2023-02-16 VITALS
HEART RATE: 70 BPM | HEIGHT: 73 IN | BODY MASS INDEX: 26.64 KG/M2 | DIASTOLIC BLOOD PRESSURE: 70 MMHG | WEIGHT: 201 LBS | OXYGEN SATURATION: 97 % | SYSTOLIC BLOOD PRESSURE: 126 MMHG

## 2023-02-16 LAB — GLUCOSE BLDC GLUCOMTR-MCNC: 269

## 2023-02-16 PROCEDURE — 99214 OFFICE O/P EST MOD 30 MIN: CPT | Mod: 25

## 2023-02-16 PROCEDURE — 95251 CONT GLUC MNTR ANALYSIS I&R: CPT

## 2023-02-16 NOTE — ASSESSMENT
[FreeTextEntry1] : uncontrolled diabetes, likely due to inconsistent meal bolus. Needs to log meals and insulin dosing and return to see CDE. Probably needs more basic education as well\par Pt. has also been injecting through shirt; advised not to do this anymore\par Improve adherence with statin\par \par Glucose Sensor Necessity:  This patient with diabetes (dx: E10.65)  is currently using a Errol continuous glucose monitor.)  The patient is treated with insulin via four injections daily. This patient requires frequent adjustments to his insulin treatment on the basis of therapeutic continuous glucose monitoring results by adjusting fixed doses or sliding scale.  In addition, the patient has been to our office for an evaluation of his diabetes control within the past 6 months.  \par \par \par

## 2023-02-16 NOTE — HISTORY OF PRESENT ILLNESS
[Continuous Glucose Monitoring] : Continuous Glucose Monitoring: Yes [Errol] : Errol [FreeTextEntry1] : Pt. reports that he belongs to a partial patient program at [facility] for a hx of depression and PTSD. The program provides group therapy Monday-Friday. He also reports that he is under extreme stress with his wife's current condition who has vascular dementia and resides at [facility] in [place]. He relieves his stress by spending time with his 14 cats and watching TV.   \par \par Quality: Type 2 DM\par Severity: uncontrolled \par Duration: over 10 years ago\par Onset: dizziness \par Modifying Factors: Better with insulin\par Associated Symptoms:\par \par Current Regimen:\par Humalog AC. Basically very erratic diet, prandial insulin used intermittently; may only have once or twice a day 10-15 units\par basaglar 35 units in am \par \par \par exercise: none\par \par PMH:\par pancreatitis\par elevated cholesterol\par cutting down on smoking\par ASHD, stents not needed. ? takasobo\par \par s/p infection of left thumb\par \par erratic schedule. had one hypoglycemic episode after bowling, so reluctant to take insulin for meals before bowling, which he does at various times 5 days a week. Often omits humalog. Drinks coffee at 2 am. [FreeTextEntry2] : 20 [FreeTextEntry3] : 80 [FreeTextEntry4] : 0 [de-identified] : 9.3 [FreeTextEntry5] : 251 [FreeTextEntry6] : 30.3

## 2023-05-02 ENCOUNTER — APPOINTMENT (OUTPATIENT)
Dept: ENDOCRINOLOGY | Facility: CLINIC | Age: 63
End: 2023-05-02
Payer: COMMERCIAL

## 2023-05-02 PROCEDURE — G0108 DIAB MANAGE TRN  PER INDIV: CPT

## 2023-05-25 ENCOUNTER — NON-APPOINTMENT (OUTPATIENT)
Age: 63
End: 2023-05-25

## 2023-05-29 ENCOUNTER — RX RENEWAL (OUTPATIENT)
Age: 63
End: 2023-05-29

## 2023-06-13 ENCOUNTER — APPOINTMENT (OUTPATIENT)
Dept: ENDOCRINOLOGY | Facility: CLINIC | Age: 63
End: 2023-06-13
Payer: COMMERCIAL

## 2023-06-13 LAB — HBA1C MFR BLD HPLC: 9.7

## 2023-06-13 PROCEDURE — G0108 DIAB MANAGE TRN  PER INDIV: CPT

## 2023-06-13 RX ORDER — FLASH GLUCOSE SENSOR
KIT MISCELLANEOUS
Qty: 6 | Refills: 3 | Status: DISCONTINUED | COMMUNITY
Start: 2018-12-10 | End: 2023-06-13

## 2023-06-13 RX ORDER — PEN NEEDLE, DIABETIC 29 G X1/2"
31G X 8 MM NEEDLE, DISPOSABLE MISCELLANEOUS
Qty: 400 | Refills: 0 | Status: DISCONTINUED | COMMUNITY
Start: 2020-03-20 | End: 2023-06-13

## 2023-06-13 RX ORDER — FLASH GLUCOSE SCANNING READER
EACH MISCELLANEOUS
Qty: 1 | Refills: 0 | Status: ACTIVE | COMMUNITY
Start: 2022-01-18

## 2023-06-13 RX ORDER — PEN NEEDLE, DIABETIC 31 GX3/16"
31G X 5 MM NEEDLE, DISPOSABLE MISCELLANEOUS
Qty: 400 | Refills: 2 | Status: DISCONTINUED | COMMUNITY
Start: 2023-05-29 | End: 2023-06-13

## 2023-06-14 RX ORDER — FLASH GLUCOSE SENSOR
KIT MISCELLANEOUS
Qty: 6 | Refills: 1 | Status: ACTIVE | COMMUNITY
Start: 2018-12-10 | End: 1900-01-01

## 2023-06-14 RX ORDER — PEN NEEDLE, DIABETIC 29 G X1/2"
31G X 5 MM NEEDLE, DISPOSABLE MISCELLANEOUS
Qty: 400 | Refills: 0 | Status: ACTIVE | COMMUNITY
Start: 2019-03-08 | End: 1900-01-01

## 2023-07-03 ENCOUNTER — NON-APPOINTMENT (OUTPATIENT)
Age: 63
End: 2023-07-03

## 2023-08-16 ENCOUNTER — APPOINTMENT (OUTPATIENT)
Dept: ENDOCRINOLOGY | Facility: CLINIC | Age: 63
End: 2023-08-16
Payer: MEDICARE

## 2023-08-16 VITALS
OXYGEN SATURATION: 98 % | SYSTOLIC BLOOD PRESSURE: 120 MMHG | HEART RATE: 66 BPM | DIASTOLIC BLOOD PRESSURE: 70 MMHG | HEIGHT: 73 IN | BODY MASS INDEX: 26.37 KG/M2 | WEIGHT: 199 LBS

## 2023-08-16 DIAGNOSIS — E10.65 TYPE 1 DIABETES MELLITUS WITH HYPERGLYCEMIA: ICD-10-CM

## 2023-08-16 LAB — GLUCOSE BLDC GLUCOMTR-MCNC: 219

## 2023-08-16 PROCEDURE — 82962 GLUCOSE BLOOD TEST: CPT

## 2023-08-16 PROCEDURE — 95251 CONT GLUC MNTR ANALYSIS I&R: CPT

## 2023-08-16 PROCEDURE — 99214 OFFICE O/P EST MOD 30 MIN: CPT | Mod: 25

## 2023-08-16 NOTE — ASSESSMENT
[FreeTextEntry1] : DM, insulin treated, likely pancreatic. Uncontrolled. -For now, increase tresiba to 46 units daily -Pt must eat more well rounded meals- the variability in his sugars is a direct correlation to meal choices . Continue Huamlog 15- 20 units (20 for pizza) pre meal. Must also give meal insulin for his bedtime snack or middle of night snack which is often an entire meal.  -Must continue to watch diet and increase exercise as tolerated, goal of 30 mins a day 5x a week -Continue to follow up with all specialists including ophtho and podiatry   hyperlipidemia - need updated lipid panel, continue statin  Glucose Sensor Necessity: This patient with diabetes (dx: E10.65) is currently using a Tipser continuous glucose monitor.) The patient is treated with insulin via four injections daily. This patient requires frequent adjustments to his insulin treatment on the basis of therapeutic continuous glucose monitoring results by adjusting fixed doses or sliding scale. In addition, the patient has been to our office for an evaluation of his diabetes control within the past 6 months.   Need updated vit d levels Fasting labs due now  RTO 3 months with Dr. Rai . 6 weeks CDE

## 2023-08-16 NOTE — HISTORY OF PRESENT ILLNESS
[FreeTextEntry1] : Pt sugars have alot of variability. Does not always eat protein. Eats cereal for bfast and lunch, dinner eats hot dogs or pizza.   Quality: Type 1 DM  (previously diagnosed type 2) Severity: uncontrolled  Duration: 48 years old Onset: dizziness  Modifying Factors: Better with insulin Associated Symptoms:  Current Regimen: Humalog 15-20 units before meals but does not give insulin for middle of night snacks  Tresiba 40 units in am - takes 100% of the time    Errol download Average glucose 293 GMI 10.3% Glucose variability 25.0%  Very high 75% High 18% Target range 7% Low 0% Very low 0%  Alot of variability. Often drops quickly after meals and then other times runs high for hours??  FS in office 219- fasting exercise: bowls  Diet: B- butter rolls, cereal , muffins L- skips, chicken, pasta D- skips, peanut and butter jelly , pizza Snacks - cookies muffins  Date of last eye exam: due now  Date of last foot exam: LV 2022- due now Date of last Pneumovax: no    HLD On statin Need updated lipid panel  Need updated vit d levels

## 2023-10-03 ENCOUNTER — APPOINTMENT (OUTPATIENT)
Dept: ENDOCRINOLOGY | Facility: CLINIC | Age: 63
End: 2023-10-03
Payer: MEDICARE

## 2023-10-03 PROCEDURE — G0108 DIAB MANAGE TRN  PER INDIV: CPT

## 2023-11-28 LAB
HBA1C MFR BLD HPLC: 9.6
LDLC SERPL DIRECT ASSAY-MCNC: 95
MICROALBUMIN/CREAT 24H UR-RTO: 21
TSH SERPL-ACNC: 0.68

## 2023-11-30 ENCOUNTER — APPOINTMENT (OUTPATIENT)
Dept: ENDOCRINOLOGY | Facility: CLINIC | Age: 63
End: 2023-11-30
Payer: MEDICARE

## 2023-11-30 VITALS
BODY MASS INDEX: 28.76 KG/M2 | OXYGEN SATURATION: 98 % | DIASTOLIC BLOOD PRESSURE: 68 MMHG | SYSTOLIC BLOOD PRESSURE: 132 MMHG | HEIGHT: 73 IN | HEART RATE: 71 BPM | WEIGHT: 217 LBS

## 2023-11-30 LAB — GLUCOSE BLDC GLUCOMTR-MCNC: 227

## 2023-11-30 PROCEDURE — 99214 OFFICE O/P EST MOD 30 MIN: CPT | Mod: 25

## 2023-11-30 PROCEDURE — 95251 CONT GLUC MNTR ANALYSIS I&R: CPT

## 2024-01-09 ENCOUNTER — APPOINTMENT (OUTPATIENT)
Dept: ENDOCRINOLOGY | Facility: CLINIC | Age: 64
End: 2024-01-09
Payer: MEDICARE

## 2024-01-09 PROCEDURE — G0108 DIAB MANAGE TRN  PER INDIV: CPT

## 2024-01-30 ENCOUNTER — APPOINTMENT (OUTPATIENT)
Dept: ENDOCRINOLOGY | Facility: CLINIC | Age: 64
End: 2024-01-30

## 2024-02-23 ENCOUNTER — NON-APPOINTMENT (OUTPATIENT)
Age: 64
End: 2024-02-23

## 2024-02-28 ENCOUNTER — APPOINTMENT (OUTPATIENT)
Dept: ENDOCRINOLOGY | Facility: CLINIC | Age: 64
End: 2024-02-28
Payer: MEDICARE

## 2024-02-28 VITALS
DIASTOLIC BLOOD PRESSURE: 60 MMHG | OXYGEN SATURATION: 97 % | SYSTOLIC BLOOD PRESSURE: 110 MMHG | HEART RATE: 72 BPM | WEIGHT: 200 LBS | BODY MASS INDEX: 26.51 KG/M2 | HEIGHT: 73 IN

## 2024-02-28 DIAGNOSIS — E78.00 PURE HYPERCHOLESTEROLEMIA, UNSPECIFIED: ICD-10-CM

## 2024-02-28 DIAGNOSIS — E55.9 VITAMIN D DEFICIENCY, UNSPECIFIED: ICD-10-CM

## 2024-02-28 LAB — GLUCOSE BLDC GLUCOMTR-MCNC: 219

## 2024-02-28 PROCEDURE — 82962 GLUCOSE BLOOD TEST: CPT

## 2024-02-28 PROCEDURE — 95251 CONT GLUC MNTR ANALYSIS I&R: CPT

## 2024-02-28 PROCEDURE — G2211 COMPLEX E/M VISIT ADD ON: CPT

## 2024-02-28 PROCEDURE — 99214 OFFICE O/P EST MOD 30 MIN: CPT

## 2024-02-28 NOTE — ASSESSMENT
[FreeTextEntry1] : DM, insulin treated, likely pancreatic. Uncontrolled. -For now, continue tresiba to 50 units daily -Pt must eat more well rounded meals- the variability in his sugars is a direct correlation to meal choices . Continue Huamlog 15- 20 units (20 for pizza) pre meal. Must take 100% of the time. Must also give meal insulin for his bedtime snack or middle of night snack which is often an entire meal.  -Must continue to watch diet and increase exercise as tolerated, goal of 30 mins a day 5x a week -Continue to follow up with all specialists including ophtho and podiatry   hyperlipidemia - need updated lipid panel, continue statin  Glucose Sensor Necessity: This patient with diabetes (dx: E10.65) is currently using a [employer] continuous glucose monitor.) The patient is treated with insulin via four injections daily. This patient requires frequent adjustments to his insulin treatment on the basis of therapeutic continuous glucose monitoring results by adjusting fixed doses or sliding scale. In addition, the patient has been to our office for an evaluation of his diabetes control within the past 6 months.   Need updated vit d levels Fasting labs due now  RTO 3 months with Dr. Rai

## 2024-02-28 NOTE — REVIEW OF SYSTEMS
[Recent Weight Loss (___ Lbs)] : recent weight loss: [unfilled] lbs [Fatigue] : no fatigue [Recent Weight Gain (___ Lbs)] : no recent weight gain [Visual Field Defect] : no visual field defect [Blurred Vision] : no blurred vision [Dysphagia] : no dysphagia [Neck Pain] : no neck pain [Dysphonia] : no dysphonia [Chest Pain] : no chest pain [Shortness Of Breath] : no shortness of breath [Constipation] : no constipation [Diarrhea] : no diarrhea [Polyuria] : no polyuria [Polydipsia] : no polydipsia [FreeTextEntry2] : 17 lbs

## 2024-02-28 NOTE — HISTORY OF PRESENT ILLNESS
[FreeTextEntry1] : Interval history: Often forgets his humalog  Not interested in pump even though cpeptide level meets requirements    Pt sugars have alot of variability. Does not always eat protein. Eats cereal for bfast and lunch, dinner eats hot dogs or pizza.   Quality: Type 1 DM  (previously diagnosed type 2) Severity: uncontrolled  Duration: 48 years old Onset: dizziness  Modifying Factors: Better with insulin Associated Symptoms:  Current Regimen: Humalog 15-20 units before meals but often forgets Tresiba 50 units in am - takes 100% of the time    Errol download Average glucose 231 GMI 8.8% Glucose variability 29.6%  Very high 39% High 37% Target range 24% Low 0% Very low 0%  Alot of variability. Often forgets his meal insulin. Can have hypoglycemia when bowling due to being more activity  FS in office 219- fasting exercise: bowling  Diet: B- butter rolls, cereal , muffins L- skips, chicken, pasta D- skips, peanut and butter jelly , pizza Snacks - cookies muffins  Date of last eye exam: LV 2023- due now for 2024 Date of last foot exam: LV 2023- q6 months Date of last Pneumovax: no    HLD On statin Need updated lipid panel  Need updated vit d levels

## 2024-03-06 RX ORDER — ERGOCALCIFEROL 1.25 MG/1
1.25 MG CAPSULE, LIQUID FILLED ORAL
Qty: 13 | Refills: 1 | Status: ACTIVE | COMMUNITY
Start: 2024-03-06 | End: 1900-01-01

## 2024-05-25 ENCOUNTER — NON-APPOINTMENT (OUTPATIENT)
Age: 64
End: 2024-05-25

## 2024-05-29 LAB
HBA1C MFR BLD HPLC: 9.4
LDLC SERPL DIRECT ASSAY-MCNC: 145
MICROALBUMIN/CREAT 24H UR-RTO: 52
TSH SERPL-ACNC: 0.47

## 2024-05-30 ENCOUNTER — APPOINTMENT (OUTPATIENT)
Dept: ENDOCRINOLOGY | Facility: CLINIC | Age: 64
End: 2024-05-30
Payer: MEDICARE

## 2024-05-30 VITALS
DIASTOLIC BLOOD PRESSURE: 70 MMHG | HEART RATE: 71 BPM | SYSTOLIC BLOOD PRESSURE: 130 MMHG | WEIGHT: 204 LBS | OXYGEN SATURATION: 96 % | HEIGHT: 73 IN | BODY MASS INDEX: 27.04 KG/M2

## 2024-05-30 DIAGNOSIS — E13.65 OTHER SPECIFIED DIABETES MELLITUS WITH HYPERGLYCEMIA: ICD-10-CM

## 2024-05-30 DIAGNOSIS — E78.00 PURE HYPERCHOLESTEROLEMIA, UNSPECIFIED: ICD-10-CM

## 2024-05-30 LAB — GLUCOSE BLDC GLUCOMTR-MCNC: 214

## 2024-05-30 PROCEDURE — G2211 COMPLEX E/M VISIT ADD ON: CPT

## 2024-05-30 PROCEDURE — 95251 CONT GLUC MNTR ANALYSIS I&R: CPT

## 2024-05-30 PROCEDURE — 99214 OFFICE O/P EST MOD 30 MIN: CPT

## 2024-05-30 PROCEDURE — 82962 GLUCOSE BLOOD TEST: CPT

## 2024-05-30 RX ORDER — INSULIN DEGLUDEC INJECTION 100 U/ML
100 INJECTION, SOLUTION SUBCUTANEOUS DAILY
Qty: 5 | Refills: 3 | Status: ACTIVE | COMMUNITY
Start: 2022-01-11 | End: 1900-01-01

## 2024-05-30 RX ORDER — LOSARTAN POTASSIUM 25 MG/1
25 TABLET, FILM COATED ORAL DAILY
Qty: 1 | Refills: 3 | Status: ACTIVE | COMMUNITY
Start: 2024-05-30 | End: 1900-01-01

## 2024-05-30 RX ORDER — ATORVASTATIN CALCIUM 40 MG/1
40 TABLET, FILM COATED ORAL
Qty: 90 | Refills: 3 | Status: ACTIVE | COMMUNITY
Start: 2019-05-20 | End: 1900-01-01

## 2024-05-30 RX ORDER — INSULIN LISPRO 100 [IU]/ML
100 INJECTION, SOLUTION INTRAVENOUS; SUBCUTANEOUS
Qty: 4 | Refills: 3 | Status: ACTIVE | COMMUNITY
Start: 2020-03-20 | End: 1900-01-01

## 2024-05-30 NOTE — ASSESSMENT
[FreeTextEntry1] : DM, insulin treated, suboptimal control due to postprandial hyperglycemia and erratic timing of meal bolus Positive microalbuminuria, start losartan 25 increase atorvastatin to 40 mg  Glucose Sensor Necessity: This patient with diabetes (dx: E10.65) is currently using a Errol continuous glucose monitor.) The patient is treated with insulin via four injections daily. This patient requires frequent adjustments to his insulin treatment on the basis of therapeutic continuous glucose monitoring results by adjusting fixed doses or sliding scale. In addition, the patient has been to our office for an evaluation of his diabetes control within the past 6 months.   Need updated vit d levels Fasting labs due now  RTO 3 months with Dr. Rai

## 2024-05-30 NOTE — HISTORY OF PRESENT ILLNESS
[Continuous Glucose Monitoring] : Continuous Glucose Monitoring: Yes [Errol] : Errol [FreeTextEntry1] : Interval history: Often forgets his humalog  Not interested in pump even though cpeptide level meets requirements    Pt sugars have alot of variability. Does not always eat protein. Eats cereal for bfast and lunch, dinner eats hot dogs or pizza.   Quality: Type 1 DM  (previously diagnosed type 2) Severity: uncontrolled  Duration: 48 years old Onset: dizziness  Modifying Factors: Better with insulin Associated Symptoms:  Current Regimen: Humalog 15-20 units before meals but often forgets Tresiba 50 units in am - takes 100% of the time    Errol download Average glucose 231 GMI 8.8% Glucose variability 29.6%  Very high 39% High 37% Target range 24% Low 0% Very low 0%  Alot of variability. Often forgets his meal insulin. Can have hypoglycemia when bowling due to being more activity  FS in office 219- fasting exercise: bowling  Diet: B- butter rolls, cereal , muffins L- skips, chicken, pasta D- skips, peanut and butter jelly , pizza Snacks - cookies muffins  Date of last eye exam: LV 2023- due now for 2024 Date of last foot exam: LV 2023- q6 months Date of last Pneumovax: no    HLD On statin Need updated lipid panel  Need updated vit d levels  [FreeTextEntry2] : 34 [FreeTextEntry3] : 66 [FreeTextEntry4] : 0 [de-identified] : 8.5 [FreeTextEntry5] : 218 [FreeTextEntry6] : 36.3

## 2024-08-29 LAB
HBA1C MFR BLD HPLC: 9.5
LDLC SERPL DIRECT ASSAY-MCNC: 78
MICROALBUMIN/CREAT 24H UR-RTO: 64
TSH SERPL-ACNC: 0.69

## 2024-09-03 ENCOUNTER — APPOINTMENT (OUTPATIENT)
Dept: ENDOCRINOLOGY | Facility: CLINIC | Age: 64
End: 2024-09-03
Payer: MEDICARE

## 2024-09-03 VITALS
SYSTOLIC BLOOD PRESSURE: 100 MMHG | HEIGHT: 73 IN | BODY MASS INDEX: 25.84 KG/M2 | HEART RATE: 67 BPM | OXYGEN SATURATION: 98 % | WEIGHT: 195 LBS | DIASTOLIC BLOOD PRESSURE: 80 MMHG

## 2024-09-03 DIAGNOSIS — E55.9 VITAMIN D DEFICIENCY, UNSPECIFIED: ICD-10-CM

## 2024-09-03 DIAGNOSIS — E78.00 PURE HYPERCHOLESTEROLEMIA, UNSPECIFIED: ICD-10-CM

## 2024-09-03 DIAGNOSIS — E13.65 OTHER SPECIFIED DIABETES MELLITUS WITH HYPERGLYCEMIA: ICD-10-CM

## 2024-09-03 DIAGNOSIS — R80.9 PROTEINURIA, UNSPECIFIED: ICD-10-CM

## 2024-09-03 LAB — GLUCOSE BLDC GLUCOMTR-MCNC: 92

## 2024-09-03 PROCEDURE — 99214 OFFICE O/P EST MOD 30 MIN: CPT

## 2024-09-03 PROCEDURE — 95251 CONT GLUC MNTR ANALYSIS I&R: CPT

## 2024-09-03 PROCEDURE — 82962 GLUCOSE BLOOD TEST: CPT

## 2024-09-03 PROCEDURE — G2211 COMPLEX E/M VISIT ADD ON: CPT

## 2024-09-03 NOTE — ASSESSMENT
[FreeTextEntry1] : DM, insulin treated, likely pancreatic. Uncontrolled. Biggest barrier is compliance with meal insulin -For now, continue tresiba to 50 units daily -Pt must eat more well rounded meals- the variability in his sugars is a direct correlation to meal choices . Continue Huamlog 15- 20 units (20 for pizza) pre meal. Must take 100% of the time. Must also give meal insulin for his bedtime snack or middle of night snack which is often an entire meal.  -Continue errol use- if he obtains a reading that he believes is inaccurate -mut check FS -Must add protein to diet!!  -Must continue to watch diet and increase exercise as tolerated, goal of 30 mins a day 5x a week -Continue to follow up with all specialists including ophtho and podiatry   hyperlipidemia - improvement noted, continue statin that was recently increased  Glucose Sensor Necessity: This patient with diabetes (dx: E10.65) is currently using a Errol continuous glucose monitor.) The patient is treated with insulin via four injections daily. This patient requires frequent adjustments to his insulin treatment on the basis of therapeutic continuous glucose monitoring results by adjusting fixed doses or sliding scale. In addition, the patient has been to our office for an evaluation of his diabetes control within the past 6 months.   Need updated vit d levels, continue weekly supplement  Fasting labs due in 3 months  RTO 3 months with NP, 6 months with Dr. Rai

## 2024-09-03 NOTE — REVIEW OF SYSTEMS
[Recent Weight Loss (___ Lbs)] : recent weight loss: [unfilled] lbs [Fatigue] : no fatigue [Recent Weight Gain (___ Lbs)] : no recent weight gain [Visual Field Defect] : no visual field defect [Blurred Vision] : no blurred vision [Dysphagia] : no dysphagia [Neck Pain] : no neck pain [Dysphonia] : no dysphonia [Chest Pain] : no chest pain [Shortness Of Breath] : no shortness of breath [Diarrhea] : no diarrhea [Polyuria] : no polyuria [Polydipsia] : no polydipsia

## 2024-09-03 NOTE — HISTORY OF PRESENT ILLNESS
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 Does not always eat protein. Eats cereal or linda chip muffin for bfast , waffles lunch, dinner eats hot dogs or pizza.   Quality: Type 1 DM  (previously diagnosed type 2) Severity: uncontrolled  Duration: 48 years old Onset: dizziness  Modifying Factors: Better with insulin Associated Symptoms:  Current Regimen: Humalog 10-20 units before meals but often forgets, 5 units for snacks  Tresiba 50 units in am - takes 100% of the time    HGA1C 9.5- 8/2024- slightly worse  Errol download Average glucose 234 GMI 8.9% Glucose variability 31.8%  Very high 42% High 33% Target range 25% Low 0% Very low 0%  Alot of variability. Often forgets his meal insulin. Can have hypoglycemia when bowling due to being more activity  FS in office 92- fasting exercise: bowling and work at [employer]  Date of last eye exam: LV 2023- due now for 2024--needs to make apt  Date of last foot exam: LV 2024- q6 months Date of last Pneumovax: no    HLD- improved On statin (recent increase to 40 mg) Total cholesterol 141, HDL 43, , LDL 78  Vit D Def On weekly supplement Need updated vit d levels  +Microalbumnuria  Worse On ARB

## 2024-12-04 ENCOUNTER — APPOINTMENT (OUTPATIENT)
Dept: ENDOCRINOLOGY | Facility: CLINIC | Age: 64
End: 2024-12-04
Payer: MEDICARE

## 2024-12-04 VITALS
OXYGEN SATURATION: 95 % | WEIGHT: 205 LBS | BODY MASS INDEX: 27.17 KG/M2 | HEIGHT: 73 IN | DIASTOLIC BLOOD PRESSURE: 70 MMHG | SYSTOLIC BLOOD PRESSURE: 124 MMHG | HEART RATE: 72 BPM

## 2024-12-04 DIAGNOSIS — E78.00 PURE HYPERCHOLESTEROLEMIA, UNSPECIFIED: ICD-10-CM

## 2024-12-04 DIAGNOSIS — E55.9 VITAMIN D DEFICIENCY, UNSPECIFIED: ICD-10-CM

## 2024-12-04 DIAGNOSIS — R80.9 PROTEINURIA, UNSPECIFIED: ICD-10-CM

## 2024-12-04 LAB
GLUCOSE BLDC GLUCOMTR-MCNC: 208
HBA1C MFR BLD HPLC: 10.5
LDLC SERPL DIRECT ASSAY-MCNC: 87
MICROALBUMIN/CREAT 24H UR-RTO: 202
TSH SERPL-ACNC: 0.62

## 2024-12-04 PROCEDURE — 82962 GLUCOSE BLOOD TEST: CPT

## 2024-12-04 PROCEDURE — G2211 COMPLEX E/M VISIT ADD ON: CPT

## 2024-12-04 PROCEDURE — 99214 OFFICE O/P EST MOD 30 MIN: CPT

## 2024-12-04 PROCEDURE — 95251 CONT GLUC MNTR ANALYSIS I&R: CPT

## 2024-12-05 ENCOUNTER — APPOINTMENT (OUTPATIENT)
Dept: ENDOCRINOLOGY | Facility: CLINIC | Age: 64
End: 2024-12-05
Payer: MEDICARE

## 2024-12-05 DIAGNOSIS — E13.65 OTHER SPECIFIED DIABETES MELLITUS WITH HYPERGLYCEMIA: ICD-10-CM

## 2024-12-05 PROCEDURE — G0108 DIAB MANAGE TRN  PER INDIV: CPT

## 2025-02-04 ENCOUNTER — APPOINTMENT (OUTPATIENT)
Dept: ENDOCRINOLOGY | Facility: CLINIC | Age: 65
End: 2025-02-04

## 2025-03-17 RX ORDER — ATORVASTATIN CALCIUM 20 MG/1
20 TABLET, FILM COATED ORAL
Qty: 90 | Refills: 3 | Status: ACTIVE | COMMUNITY
Start: 2025-03-17 | End: 1900-01-01

## 2025-04-14 ENCOUNTER — NON-APPOINTMENT (OUTPATIENT)
Age: 65
End: 2025-04-14

## 2025-04-15 LAB
HBA1C MFR BLD HPLC: 9.8
LDLC SERPL DIRECT ASSAY-MCNC: 99
MICROALBUMIN/CREAT 24H UR-RTO: 143
TSH SERPL-ACNC: 0.78

## 2025-04-16 ENCOUNTER — APPOINTMENT (OUTPATIENT)
Dept: ENDOCRINOLOGY | Facility: CLINIC | Age: 65
End: 2025-04-16
Payer: MEDICARE

## 2025-04-16 VITALS
OXYGEN SATURATION: 92 % | SYSTOLIC BLOOD PRESSURE: 120 MMHG | DIASTOLIC BLOOD PRESSURE: 70 MMHG | WEIGHT: 211 LBS | HEIGHT: 73 IN | HEART RATE: 68 BPM | BODY MASS INDEX: 27.96 KG/M2

## 2025-04-16 DIAGNOSIS — E13.65 OTHER SPECIFIED DIABETES MELLITUS WITH HYPERGLYCEMIA: ICD-10-CM

## 2025-04-16 DIAGNOSIS — E78.00 PURE HYPERCHOLESTEROLEMIA, UNSPECIFIED: ICD-10-CM

## 2025-04-16 LAB — GLUCOSE BLDC GLUCOMTR-MCNC: 219

## 2025-04-16 PROCEDURE — 99214 OFFICE O/P EST MOD 30 MIN: CPT

## 2025-04-16 PROCEDURE — 95251 CONT GLUC MNTR ANALYSIS I&R: CPT

## 2025-04-16 PROCEDURE — 82962 GLUCOSE BLOOD TEST: CPT

## 2025-04-16 PROCEDURE — G2211 COMPLEX E/M VISIT ADD ON: CPT

## 2025-04-17 RX ORDER — INSULIN ASPART 100 [IU]/ML
100 INJECTION, SOLUTION INTRAVENOUS; SUBCUTANEOUS
Qty: 4 | Refills: 1 | Status: ACTIVE | COMMUNITY
Start: 2025-04-17 | End: 1900-01-01

## 2025-06-10 ENCOUNTER — RX RENEWAL (OUTPATIENT)
Age: 65
End: 2025-06-10

## 2025-06-19 ENCOUNTER — RX RENEWAL (OUTPATIENT)
Age: 65
End: 2025-06-19

## 2025-07-15 ENCOUNTER — APPOINTMENT (OUTPATIENT)
Dept: ENDOCRINOLOGY | Facility: CLINIC | Age: 65
End: 2025-07-15
Payer: MEDICARE

## 2025-07-15 VITALS
BODY MASS INDEX: 29.82 KG/M2 | HEIGHT: 73 IN | WEIGHT: 225 LBS | DIASTOLIC BLOOD PRESSURE: 70 MMHG | HEART RATE: 63 BPM | SYSTOLIC BLOOD PRESSURE: 118 MMHG | OXYGEN SATURATION: 98 %

## 2025-07-15 LAB
GLUCOSE BLDC GLUCOMTR-MCNC: 106
GLUCOSE BLDC GLUCOMTR-MCNC: 106
HBA1C MFR BLD HPLC: 9.9
LDLC SERPL DIRECT ASSAY-MCNC: 71
MICROALBUMIN/CREAT 24H UR-RTO: 75
TSH SERPL-ACNC: 0.59

## 2025-07-15 PROCEDURE — 99214 OFFICE O/P EST MOD 30 MIN: CPT

## 2025-07-15 PROCEDURE — 95251 CONT GLUC MNTR ANALYSIS I&R: CPT

## 2025-07-15 PROCEDURE — G2211 COMPLEX E/M VISIT ADD ON: CPT

## 2025-07-15 PROCEDURE — 82962 GLUCOSE BLOOD TEST: CPT

## 2025-08-12 ENCOUNTER — APPOINTMENT (OUTPATIENT)
Dept: ENDOCRINOLOGY | Facility: CLINIC | Age: 65
End: 2025-08-12
Payer: MEDICARE

## 2025-08-12 DIAGNOSIS — E13.65 OTHER SPECIFIED DIABETES MELLITUS WITH HYPERGLYCEMIA: ICD-10-CM

## 2025-08-12 DIAGNOSIS — Z60.2 PROBLEMS RELATED TO LIVING ALONE: ICD-10-CM

## 2025-08-12 PROCEDURE — G0108 DIAB MANAGE TRN  PER INDIV: CPT

## 2025-08-12 RX ORDER — INSULIN PMP CART,AUT,G6/7,CNTR
EACH SUBCUTANEOUS
Qty: 12 | Refills: 3 | Status: ACTIVE | COMMUNITY
Start: 2025-08-12 | End: 1900-01-01

## 2025-08-12 RX ORDER — INSULIN PMP CART,AUT,G6/7,CNTR
EACH SUBCUTANEOUS
Qty: 1 | Refills: 0 | Status: ACTIVE | COMMUNITY
Start: 2025-08-12 | End: 1900-01-01

## 2025-08-12 SDOH — SOCIAL STABILITY - SOCIAL INSECURITY: PROBLEMS RELATED TO LIVING ALONE: Z60.2

## 2025-08-28 RX ORDER — INSULIN ASPART 100 [IU]/ML
100 INJECTION, SOLUTION INTRAVENOUS; SUBCUTANEOUS
Qty: 15 | Refills: 2 | Status: ACTIVE | COMMUNITY
Start: 2025-08-28 | End: 1900-01-01

## 2025-09-03 ENCOUNTER — APPOINTMENT (OUTPATIENT)
Dept: ENDOCRINOLOGY | Facility: CLINIC | Age: 65
End: 2025-09-03
Payer: COMMERCIAL

## 2025-09-03 PROCEDURE — P0019: CPT

## 2025-09-04 ENCOUNTER — APPOINTMENT (OUTPATIENT)
Dept: ENDOCRINOLOGY | Facility: CLINIC | Age: 65
End: 2025-09-04
Payer: MEDICARE

## 2025-09-04 VITALS
HEIGHT: 73 IN | SYSTOLIC BLOOD PRESSURE: 118 MMHG | BODY MASS INDEX: 29.82 KG/M2 | WEIGHT: 225 LBS | OXYGEN SATURATION: 94 % | DIASTOLIC BLOOD PRESSURE: 62 MMHG | HEART RATE: 65 BPM

## 2025-09-04 DIAGNOSIS — E13.65 OTHER SPECIFIED DIABETES MELLITUS WITH HYPERGLYCEMIA: ICD-10-CM

## 2025-09-04 DIAGNOSIS — R80.9 PROTEINURIA, UNSPECIFIED: ICD-10-CM

## 2025-09-04 DIAGNOSIS — E78.00 PURE HYPERCHOLESTEROLEMIA, UNSPECIFIED: ICD-10-CM

## 2025-09-04 DIAGNOSIS — E55.9 VITAMIN D DEFICIENCY, UNSPECIFIED: ICD-10-CM

## 2025-09-04 LAB — GLUCOSE BLDC GLUCOMTR-MCNC: 220

## 2025-09-04 PROCEDURE — 82962 GLUCOSE BLOOD TEST: CPT

## 2025-09-04 PROCEDURE — G2211 COMPLEX E/M VISIT ADD ON: CPT

## 2025-09-04 PROCEDURE — 99214 OFFICE O/P EST MOD 30 MIN: CPT

## 2025-09-04 PROCEDURE — 95251 CONT GLUC MNTR ANALYSIS I&R: CPT

## 2025-09-04 PROCEDURE — G0108 DIAB MANAGE TRN  PER INDIV: CPT
